# Patient Record
Sex: MALE | Race: BLACK OR AFRICAN AMERICAN | URBAN - METROPOLITAN AREA
[De-identification: names, ages, dates, MRNs, and addresses within clinical notes are randomized per-mention and may not be internally consistent; named-entity substitution may affect disease eponyms.]

---

## 2020-11-24 ENCOUNTER — OFFICE VISIT (OUTPATIENT)
Dept: URGENT CARE | Age: 35
End: 2020-11-24

## 2020-11-24 VITALS — HEART RATE: 89 BPM | RESPIRATION RATE: 17 BRPM | TEMPERATURE: 98.8 F | OXYGEN SATURATION: 98 %

## 2020-11-24 DIAGNOSIS — Z20.822 EXPOSURE TO COVID-19 VIRUS: Primary | ICD-10-CM

## 2020-11-24 PROCEDURE — 99202 OFFICE O/P NEW SF 15 MIN: CPT | Performed by: NURSE PRACTITIONER

## 2020-11-24 NOTE — LETTER
NOTIFICATION RETURN TO WORK / SCHOOL 
 
11/24/2020 7:27 PM 
 
Mr. Whit Byrne [address] [address] To Whom It May Concern: 
 
Whit Byrne is currently under the care of [address]. Please allow to quarantine/self isolate until: 12/07/2020 If there are questions or concerns please have the patient contact our office. Sincerely, GHE PROVIDER

## 2020-11-25 NOTE — PROGRESS NOTES
Subjective: (As above and below)       This patient was seen in [facility] at [address] [place] [address] Urgent Care while in their vehicle due to COVID-19 pandemic with PPE and focused examination in order to decrease community viral transmission. The patient/guardian gave verbal consent to treat. Chief Complaint   Patient presents with    Concern For COVID-19 (Coronavirus)     Pt reports exposure to COVID 19, denies all symptoms at this time. Olaf Castellanos is a 30 y.o. male who presents for COVID-19 Exposure and testing. Currently has not tried any therapies and denies any symptoms at this point in time. Feels well otherwise. Recent travel: no  Known Exposure to COVID-19: YES  Known flu or strep contact: no         ROS- negative for dizziness, cough, SOB, rhinorrhea, myalgias, n/v/d, rashes, headaches, fevers, chills, chest pains. Review of Systems - negative except as listed above    Reviewed PmHx, RxHx, FmHx, SocHx, AllgHx and updated in chart. History reviewed. No pertinent family history. History reviewed. No pertinent past medical history. Social History     Socioeconomic History    Marital status: UNKNOWN     Spouse name: Not on file    Number of children: Not on file    Years of education: Not on file    Highest education level: Not on file   Tobacco Use    Smoking status: Current Every Day Smoker    Smokeless tobacco: Never Used          No current outpatient medications on file. No current facility-administered medications for this visit. Objective:     Vitals:    11/24/20 1919   Pulse: 89   Resp: 17   Temp: 98.8 °F (37.1 °C)   SpO2: 98%       Physical Exam  General appearance  appears well hydrated and does not appear toxic, no acute distress  Eyes - EOMs intact. Non injected. No scleral icterus   Ears - no external swelling  Nose - No purulent drainage  Neck/Lymphatics  trachea midline, full AROM  Chest - normal breathing effort. No active cough heard.  No audible wheezing. Heart - HR-see vitals  Skin - no observable rashes or pallor  Neurologic- alert and oriented x 3  Psychiatric- normal mood, behavior and though content. Assessment/ Plan:     1. Exposure to COVID-19 virus    - NOVEL CORONAVIRUS (COVID-19)      Will test for COVID-19 given exposure  Supportive home care reviewed for any development of mild symptoms - tylenol, maintain adequate fluid intake, deep breathing exercises  Self isolate/quarantine advised based on recommendations in current CDC guidelines. Follow up: We have reviewed, in detail, particular presentations/worsening/concerning signs and symptoms that may warrant seeking immediate care in the ED setting and patient verbalized being aware of what to watch for. For other non-severe changes, non-improvement or questions, patient aware to contact PCP office or consider a virtual online medical consultation.         Miguel Vila NP

## 2020-11-27 LAB — SARS-COV-2, NAA: NOT DETECTED

## 2025-01-21 ENCOUNTER — OFFICE VISIT (OUTPATIENT)
Age: 40
End: 2025-01-21

## 2025-01-21 VITALS
HEART RATE: 100 BPM | SYSTOLIC BLOOD PRESSURE: 146 MMHG | RESPIRATION RATE: 16 BRPM | DIASTOLIC BLOOD PRESSURE: 95 MMHG | WEIGHT: 290 LBS | TEMPERATURE: 98.5 F | OXYGEN SATURATION: 100 %

## 2025-01-21 DIAGNOSIS — R36.9 PENILE DISCHARGE: Primary | ICD-10-CM

## 2025-01-21 RX ORDER — DOXYCYCLINE HYCLATE 100 MG
100 TABLET ORAL 2 TIMES DAILY
Qty: 14 TABLET | Refills: 0 | Status: SHIPPED | OUTPATIENT
Start: 2025-01-21 | End: 2025-01-28

## 2025-01-21 RX ORDER — CEFTRIAXONE 500 MG/1
500 INJECTION, POWDER, FOR SOLUTION INTRAMUSCULAR; INTRAVENOUS ONCE
Status: COMPLETED | OUTPATIENT
Start: 2025-01-21 | End: 2025-01-21

## 2025-01-21 RX ADMIN — CEFTRIAXONE 500 MG: 500 INJECTION, POWDER, FOR SOLUTION INTRAMUSCULAR; INTRAVENOUS at 11:43

## 2025-01-21 NOTE — PATIENT INSTRUCTIONS
Patient was seen today for evaluation of penile discharge which started just today  Will treat empirically for STIs  Ceftriaxone 500 mg administered intramuscularly in clinic  Doxycycline, twice daily for 7 days sent to pharmacy  Avoid any sexual intercourse until complete with treatment and symptoms have resolved  We have sent urine to the lab which will test for chlamydia, gonorrhea and trichomonas, we will contact you with these results once back  Please alert any sexual partners of your symptoms and ensure that they are evaluated and tested as well

## 2025-01-21 NOTE — PROGRESS NOTES
Guanaco Jimenez (:  1985) is a 39 y.o. male,New patient, here for evaluation of the following chief complaint(s):  Penile Discharge (Penile discharge, urinary pressure,/X this morning )      Assessment & Plan :  Visit Diagnoses and Associated Orders       Penile discharge    -  Primary    cefTRIAXone (ROCEPHIN) injection 500 mg [9490]      doxycycline hyclate (VIBRA-TABS) 100 MG tablet [2625]      Chlamydia, Gonorrhea, Trichomoniasis [QDX12046 Custom]   - Future Order               Patient was seen today for evaluation of penile discharge which started just today  Will treat empirically for STIs  Ceftriaxone 500 mg administered intramuscularly in clinic  Doxycycline, twice daily for 7 days sent to pharmacy  Avoid any sexual intercourse until complete with treatment and symptoms have resolved  We have sent urine to the lab which will test for chlamydia, gonorrhea and trichomonas, we will contact you with these results once back  Please alert any sexual partners of your symptoms and ensure that they are evaluated and tested as well       Subjective :    Penile Discharge  The patient's primary symptoms include penile discharge.        39 y.o. male presents with symptoms of penile discharge that just started this morning.  He states he did have a sexual encounter 2 days ago with a partner during which the condom broke.  He has tried contacting this partner to see if they are having similar symptoms but has not heard anything back.  He states that he has had STIs in the past and that this feels very similar.  The last time he was seen he was treated empirically with injection and oral medications and he would like this treatment again today.  He denies any dysuria, hematuria or changes in urinary frequency.  Denies fevers, chills, body aches or fatigue.  Denies any significant back or flank pain.  Denies any sores or lesions.         Vitals:    25 1116   BP: (!) 146/95   Site: Left Upper Arm

## 2025-01-25 LAB
C TRACH RRNA SPEC QL NAA+PROBE: NEGATIVE
N GONORRHOEA RRNA SPEC QL NAA+PROBE: POSITIVE
SPECIMEN SOURCE: ABNORMAL
T VAGINALIS RRNA SPEC QL NAA+PROBE: NEGATIVE